# Patient Record
Sex: FEMALE | Race: WHITE | Employment: UNEMPLOYED | URBAN - METROPOLITAN AREA
[De-identification: names, ages, dates, MRNs, and addresses within clinical notes are randomized per-mention and may not be internally consistent; named-entity substitution may affect disease eponyms.]

---

## 2017-09-08 ENCOUNTER — NURSE ONLY (OUTPATIENT)
Dept: CARDIOLOGY CLINIC | Age: 57
End: 2017-09-08

## 2017-09-08 ENCOUNTER — OFFICE VISIT (OUTPATIENT)
Dept: CARDIOLOGY CLINIC | Age: 57
End: 2017-09-08

## 2017-09-08 VITALS
BODY MASS INDEX: 30.96 KG/M2 | HEART RATE: 80 BPM | SYSTOLIC BLOOD PRESSURE: 130 MMHG | HEIGHT: 61 IN | DIASTOLIC BLOOD PRESSURE: 86 MMHG | WEIGHT: 164 LBS

## 2017-09-08 DIAGNOSIS — R07.9 CHEST PAIN, UNSPECIFIED TYPE: ICD-10-CM

## 2017-09-08 DIAGNOSIS — R00.2 PALPITATIONS: Primary | ICD-10-CM

## 2017-09-08 DIAGNOSIS — E78.5 DYSLIPIDEMIA: ICD-10-CM

## 2017-09-08 DIAGNOSIS — R07.9 CHEST PAIN IN ADULT: ICD-10-CM

## 2017-09-08 DIAGNOSIS — I10 ESSENTIAL HYPERTENSION: ICD-10-CM

## 2017-09-08 DIAGNOSIS — Z72.0 TOBACCO USE: ICD-10-CM

## 2017-09-08 DIAGNOSIS — R00.2 PALPITATIONS: ICD-10-CM

## 2017-09-08 PROCEDURE — 99204 OFFICE O/P NEW MOD 45 MIN: CPT | Performed by: INTERNAL MEDICINE

## 2017-09-08 PROCEDURE — 93000 ELECTROCARDIOGRAM COMPLETE: CPT | Performed by: INTERNAL MEDICINE

## 2017-09-08 RX ORDER — ATORVASTATIN CALCIUM 10 MG/1
10 TABLET, FILM COATED ORAL DAILY
COMMUNITY

## 2017-09-08 RX ORDER — OMEPRAZOLE 20 MG/1
20 CAPSULE, DELAYED RELEASE ORAL 2 TIMES DAILY
COMMUNITY

## 2017-09-14 ENCOUNTER — HOSPITAL ENCOUNTER (OUTPATIENT)
Dept: NON INVASIVE DIAGNOSTICS | Age: 57
Discharge: HOME | End: 2017-09-14
Attending: INTERNAL MEDICINE | Admitting: INTERNAL MEDICINE

## 2017-09-14 DIAGNOSIS — R07.9 CHEST PAIN: ICD-10-CM

## 2017-09-14 LAB
LV EF: 85 %
LVEF MODALITY: NORMAL

## 2017-09-29 PROCEDURE — 93272 ECG/REVIEW INTERPRET ONLY: CPT | Performed by: INTERNAL MEDICINE

## 2017-10-05 ENCOUNTER — TELEPHONE (OUTPATIENT)
Dept: CARDIOLOGY CLINIC | Age: 57
End: 2017-10-05

## 2017-11-02 ENCOUNTER — PAT TELEPHONE (OUTPATIENT)
Dept: PREADMISSION TESTING | Age: 57
End: 2017-11-02

## 2017-11-02 VITALS — BODY MASS INDEX: 30.21 KG/M2 | HEIGHT: 61 IN | WEIGHT: 160 LBS

## 2017-11-02 RX ORDER — CLONAZEPAM 1 MG/1
1 TABLET ORAL 3 TIMES DAILY PRN
COMMUNITY

## 2017-11-02 NOTE — PROGRESS NOTES
[facility] time___0700_________        Surgery time_0800___________    Take the following medications with a sip of water:    Do not eat or drink anything after 12:00 midnight prior to your surgery. EXCEPT PREP  This includes water chewing gum, mints and ice chips. You may brush your teeth and gargle the morning of your surgery, but do not swallow the water      You may be asked to stop blood thinners such as Coumadin, Plavix, Fragmin, Lovenox, etc., or any anti-inflammatories such as:  Aspirin, Ibuprofen, Advil, Naproxen prior to your surgery. We also ask that you stop any OTC medications such as fish oil, vitamin E, glucosamine, garlic, Multivitamins, COQ 10, etc.    We ask that you do not smoke 24 hours prior to surgery  We ask that you do not  drink any alcoholic beverages 24 hours prior to surgery     You must make arrangements for a responsible adult to take you home after your surgery. For your safety you will not be allowed to leave alone or drive yourself home. Your surgery will be cancelled if you do not have a ride home. Also for your safety, it is strongly suggested that someone stay with you the first 24 hours after your surgery. A parent or legal guardian must accompany a child scheduled for surgery and plan to stay at the hospital until the child is discharged. Please do not bring other children with you. For your comfort, please wear simple loose fitting clothing to the hospital.  Please do not bring valuables. Do not wear any make-up or nail polish on your fingers or toes      For your safety, please do not wear any jewelry or body piercing's on the day of surgery. All jewelry must be removed. If you have dentures, they will be removed before going to operating room. For your convenience, we will provide you with a container.     If you wear contact lenses or glasses, they will be removed, please bring a case for

## 2017-11-08 ENCOUNTER — SURG/PROC ORDERS (OUTPATIENT)
Dept: ANESTHESIOLOGY | Age: 57
End: 2017-11-08

## 2017-11-08 RX ORDER — SODIUM CHLORIDE 0.9 % (FLUSH) 0.9 %
10 SYRINGE (ML) INJECTION EVERY 12 HOURS SCHEDULED
Status: CANCELLED | OUTPATIENT
Start: 2017-11-08

## 2017-11-08 RX ORDER — SODIUM CHLORIDE 0.9 % (FLUSH) 0.9 %
10 SYRINGE (ML) INJECTION PRN
Status: CANCELLED | OUTPATIENT
Start: 2017-11-08

## 2017-11-08 RX ORDER — SODIUM CHLORIDE 9 MG/ML
INJECTION, SOLUTION INTRAVENOUS CONTINUOUS
Status: CANCELLED | OUTPATIENT
Start: 2017-11-08

## 2017-11-09 ENCOUNTER — HOSPITAL ENCOUNTER (OUTPATIENT)
Dept: ENDOSCOPY | Age: 57
Discharge: HOME | End: 2017-11-09
Attending: INTERNAL MEDICINE | Admitting: INTERNAL MEDICINE

## 2017-11-09 VITALS
RESPIRATION RATE: 12 BRPM | HEART RATE: 70 BPM | WEIGHT: 156 LBS | SYSTOLIC BLOOD PRESSURE: 128 MMHG | TEMPERATURE: 98.2 F | BODY MASS INDEX: 29.45 KG/M2 | DIASTOLIC BLOOD PRESSURE: 71 MMHG | HEIGHT: 61 IN | OXYGEN SATURATION: 98 %

## 2017-11-09 RX ORDER — SODIUM CHLORIDE 0.9 % (FLUSH) 0.9 %
10 SYRINGE (ML) INJECTION EVERY 12 HOURS SCHEDULED
Status: DISCONTINUED | OUTPATIENT
Start: 2017-11-09 | End: 2017-11-10 | Stop reason: HOSPADM

## 2017-11-09 RX ORDER — SODIUM CHLORIDE 9 MG/ML
INJECTION, SOLUTION INTRAVENOUS CONTINUOUS
Status: DISCONTINUED | OUTPATIENT
Start: 2017-11-09 | End: 2017-11-10 | Stop reason: HOSPADM

## 2017-11-09 RX ORDER — SODIUM CHLORIDE 0.9 % (FLUSH) 0.9 %
10 SYRINGE (ML) INJECTION PRN
Status: DISCONTINUED | OUTPATIENT
Start: 2017-11-09 | End: 2017-11-10 | Stop reason: HOSPADM

## 2017-11-09 RX ADMIN — SODIUM CHLORIDE: 9 INJECTION, SOLUTION INTRAVENOUS at 07:43

## 2017-11-09 ASSESSMENT — PAIN - FUNCTIONAL ASSESSMENT: PAIN_FUNCTIONAL_ASSESSMENT: 0-10

## 2017-11-09 ASSESSMENT — PAIN SCALES - GENERAL
PAINLEVEL_OUTOF10: 0
PAINLEVEL_OUTOF10: 0

## 2017-11-09 NOTE — H&P
[facility]   Pre-operative History and Physical    Patient: Freda Dobbins  : 1960  Acct#:         HISTORY OF PRESENT ILLNESS:    The patient is a 62 y.o. female  who presents with colon cancer screening  Past Medical History:        Diagnosis Date    Anxiety     Asthma     Allergy induced Asthma    Depression     HYPERCHOLESTERAEMIA     Hypertension      Past Surgical History:        Procedure Laterality Date     SECTION      CYST REMOVAL  [id number]    H/O pilonidal cyst (7 surgeries for that)    PILONIDAL CYST EXCISION      x 7     Medications Prior to Admission:   Current Outpatient Prescriptions on File Prior to Encounter   Medication Sig Dispense Refill    clonazePAM (KLONOPIN) 1 MG tablet Take 1 mg by mouth 3 times daily as needed      atorvastatin (LIPITOR) 10 MG tablet Take 10 mg by mouth daily      Venlafaxine HCl (EFFEXOR PO) Take 225 mg by mouth daily      omeprazole (PRILOSEC) 20 MG delayed release capsule Take 20 mg by mouth daily      traZODone (DESYREL) 100 MG tablet Take 300 mg by mouth nightly.  ALBUTEROL SULFATE HFA IN Inhale 2 puffs into the lungs every 4 hours as needed (FOR sob).  hydrochlorothiazide (HYDRODIURIL) 25 MG tablet Take 1 tablet by mouth daily. 30 tablet 2    aspirin 81 MG EC tablet Take 81 mg by mouth See Admin Instructions. Indications: every M,W, and F      albuterol (PROVENTIL HFA) 108 (90 BASE) MCG/ACT inhaler Inhale 2 puffs into the lungs every 6 hours as needed for Wheezing. 1 Inhaler 2     No current facility-administered medications on file prior to encounter. Allergies:  Latex; Avocado; Banana; Ace inhibitors; and Kiwi extract    Social History:      Social History     Social History    Marital status:      Spouse name: N/A    Number of children: N/A    Years of education: N/A     Occupational History    Not on file.      Social History Main Topics    Smoking status: Current Every Day Smoker     Packs/day: 0.50

## 2017-11-09 NOTE — BRIEF OP NOTE
Brief Postoperative Note  Gina Gan  1960  [id number]    Previous Colonoscopy: No  Date: Unknown  Greater than 3 years?  Yes    Pre-operative Diagnosis: Screening    Post-operative Diagnosis: Same    Procedure: Colonoscopy    Anesthesia: MAC    Surgeons/Assistants: Joe    Estimated Blood Loss: None    Complications: None    Specimens: Was Not Obtained    Findings: See dictated report    Electronically signed by Prachi Chang MD, on 11/9/2017, at 8:19 AM

## 2017-11-09 NOTE — ANESTHESIA POST-OP
Anesthesia Post-op Note    Patient: Smith Hand    Procedure(s) Performed: colonoscopy    DOS: 11.9.17    Surgeon: Alberto Fuller     Anesthesia type: TIVA    Post-op assessment:  Anesthetic Problems: no   Last Vitals:    Vitals:    11/09/17 0840   BP: 128/71   Pulse: 70   Resp: 12   Temp:    SpO2: 98%     Cardiovascular System Stable: yes  Respiratory Function: Airway Patent yes  ETT no  Ventilator no  Level of consciousness: awake, alert and oriented  Post-op pain: adequate analgesia  Hydration Adequate: yes  Nausea/Vomiting:no  Other Issues:

## 2020-08-19 RX ORDER — MELATONIN 10 MG
CAPSULE ORAL
COMMUNITY

## 2020-08-19 RX ORDER — AMOXICILLIN 250 MG
1 CAPSULE ORAL DAILY
COMMUNITY

## 2020-08-19 RX ORDER — IBUPROFEN 400 MG/1
400 TABLET ORAL EVERY 6 HOURS PRN
COMMUNITY

## 2020-08-19 RX ORDER — M-VIT,TX,IRON,MINS/CALC/FOLIC 27MG-0.4MG
1 TABLET ORAL DAILY
COMMUNITY

## 2020-08-20 ENCOUNTER — OFFICE VISIT (OUTPATIENT)
Dept: PRIMARY CARE CLINIC | Age: 60
End: 2020-08-20

## 2020-08-20 NOTE — PATIENT INSTRUCTIONS
Advance Care Planning  People with COVID-19 may have no symptoms, mild symptoms, such as fever, cough, and shortness of breath or they may have more severe illness, developing severe and fatal pneumonia. As a result, Advance Care Planning with attention to naming a health care decision maker (someone you trust to make healthcare decisions for you if you could not speak for yourself) and sharing other health care preferences is important BEFORE a possible health crisis. Please contact your Primary Care Provider to discuss Advance Care Planning. Preventing the Spread of Coronavirus Disease 2019 in Homes and Residential Communities  For the most recent information go to [employer]    Prevention steps for People with confirmed or suspected COVID-19 (including persons under investigation) who do not need to be hospitalized  and   People with confirmed COVID-19 who were hospitalized and determined to be medically stable to go home    Your healthcare provider and public health staff will evaluate whether you can be cared for at home. If it is determined that you do not need to be hospitalized and can be isolated at home, you will be monitored by staff from your local or state health department. You should follow the prevention steps below until a healthcare provider or local or state health department says you can return to your normal activities. Stay home except to get medical care  People who are mildly ill with COVID-19 are able to isolate at home during their illness. You should restrict activities outside your home, except for getting medical care. Do not go to work, school, or public areas. Avoid using public transportation, ride-sharing, or taxis. Separate yourself from other people and animals in your home  People: As much as possible, you should stay in a specific room and away from other people in your home.  Also, you should use a separate before eating or preparing food. If soap and water are not readily available, use an alcohol-based hand  with at least 60% alcohol, covering all surfaces of your hands and rubbing them together until they feel dry. Soap and water are the best option if hands are visibly dirty. Avoid touching your eyes, nose, and mouth with unwashed hands. Avoid sharing personal household items  You should not share dishes, drinking glasses, cups, eating utensils, towels, or bedding with other people or pets in your home. After using these items, they should be washed thoroughly with soap and water. Clean all high-touch surfaces everyday  High touch surfaces include counters, tabletops, doorknobs, bathroom fixtures, toilets, phones, keyboards, tablets, and bedside tables. Also, clean any surfaces that may have blood, stool, or body fluids on them. Use a household cleaning spray or wipe, according to the label instructions. Labels contain instructions for safe and effective use of the cleaning product including precautions you should take when applying the product, such as wearing gloves and making sure you have good ventilation during use of the product. Monitor your symptoms  Seek prompt medical attention if your illness is worsening (e.g., difficulty breathing). Before seeking care, call your healthcare provider and tell them that you have, or are being evaluated for, COVID-19. Put on a facemask before you enter the facility. These steps will help the healthcare providers office to keep other people in the office or waiting room from getting infected or exposed. Ask your healthcare provider to call the local or state health department. Persons who are placed under active monitoring or facilitated self-monitoring should follow instructions provided by their local health department or occupational health professionals, as appropriate. When working with your local health department check their available hours.   If you have a medical emergency and need to call 911, notify the dispatch personnel that you have, or are being evaluated for COVID-19. If possible, put on a facemask before emergency medical services arrive. Discontinuing home isolation  Patients with confirmed COVID-19 should remain under home isolation precautions until the risk of secondary transmission to others is thought to be low. The decision to discontinue home isolation precautions should be made on a case-by-case basis, in consultation with healthcare providers and state and local health departments.

## 2020-08-20 NOTE — PROGRESS NOTES
Jolie Us received a viral test for COVID-19. They were educated on isolation and quarantine as appropriate. For any symptoms, they were directed to seek care from their PCP, given contact information to establish with a doctor, directed to an urgent care or the emergency room.

## 2020-08-22 LAB — SARS-COV-2, NAA: NOT DETECTED

## 2020-08-25 ENCOUNTER — ANESTHESIA EVENT (OUTPATIENT)
Dept: ENDOSCOPY | Age: 60
End: 2020-08-25
Payer: COMMERCIAL

## 2020-08-26 ENCOUNTER — HOSPITAL ENCOUNTER (OUTPATIENT)
Age: 60
Setting detail: OUTPATIENT SURGERY
Discharge: HOME | End: 2020-08-26
Attending: INTERNAL MEDICINE | Admitting: INTERNAL MEDICINE
Payer: COMMERCIAL

## 2020-08-26 ENCOUNTER — ANESTHESIA (OUTPATIENT)
Dept: ENDOSCOPY | Age: 60
End: 2020-08-26
Payer: COMMERCIAL

## 2020-08-26 VITALS
BODY MASS INDEX: 31.53 KG/M2 | SYSTOLIC BLOOD PRESSURE: 115 MMHG | WEIGHT: 167 LBS | HEART RATE: 66 BPM | HEIGHT: 61 IN | RESPIRATION RATE: 16 BRPM | DIASTOLIC BLOOD PRESSURE: 96 MMHG | TEMPERATURE: 97 F | OXYGEN SATURATION: 100 %

## 2020-08-26 VITALS
OXYGEN SATURATION: 98 % | SYSTOLIC BLOOD PRESSURE: 97 MMHG | RESPIRATION RATE: 14 BRPM | DIASTOLIC BLOOD PRESSURE: 48 MMHG

## 2020-08-26 PROCEDURE — 2500000003 HC RX 250 WO HCPCS

## 2020-08-26 PROCEDURE — 2709999900 HC NON-CHARGEABLE SUPPLY: Performed by: INTERNAL MEDICINE

## 2020-08-26 PROCEDURE — 7100000011 HC PHASE II RECOVERY - ADDTL 15 MIN: Performed by: INTERNAL MEDICINE

## 2020-08-26 PROCEDURE — 3700000001 HC ADD 15 MINUTES (ANESTHESIA): Performed by: INTERNAL MEDICINE

## 2020-08-26 PROCEDURE — 88305 TISSUE EXAM BY PATHOLOGIST: CPT

## 2020-08-26 PROCEDURE — 3609012400 HC EGD TRANSORAL BIOPSY SINGLE/MULTIPLE: Performed by: INTERNAL MEDICINE

## 2020-08-26 PROCEDURE — 2580000003 HC RX 258: Performed by: ANESTHESIOLOGY

## 2020-08-26 PROCEDURE — 3609017100 HC EGD: Performed by: INTERNAL MEDICINE

## 2020-08-26 PROCEDURE — 7100000010 HC PHASE II RECOVERY - FIRST 15 MIN: Performed by: INTERNAL MEDICINE

## 2020-08-26 PROCEDURE — 6360000002 HC RX W HCPCS

## 2020-08-26 PROCEDURE — 3700000000 HC ANESTHESIA ATTENDED CARE: Performed by: INTERNAL MEDICINE

## 2020-08-26 RX ORDER — SODIUM CHLORIDE 0.9 % (FLUSH) 0.9 %
10 SYRINGE (ML) INJECTION EVERY 12 HOURS SCHEDULED
Status: DISCONTINUED | OUTPATIENT
Start: 2020-08-26 | End: 2020-08-26 | Stop reason: HOSPADM

## 2020-08-26 RX ORDER — GLYCOPYRROLATE 0.2 MG/ML
INJECTION INTRAMUSCULAR; INTRAVENOUS PRN
Status: DISCONTINUED | OUTPATIENT
Start: 2020-08-26 | End: 2020-08-26 | Stop reason: SDUPTHER

## 2020-08-26 RX ORDER — SODIUM CHLORIDE 0.9 % (FLUSH) 0.9 %
10 SYRINGE (ML) INJECTION PRN
Status: DISCONTINUED | OUTPATIENT
Start: 2020-08-26 | End: 2020-08-26 | Stop reason: HOSPADM

## 2020-08-26 RX ORDER — LIDOCAINE HYDROCHLORIDE 20 MG/ML
INJECTION, SOLUTION EPIDURAL; INFILTRATION; INTRACAUDAL; PERINEURAL PRN
Status: DISCONTINUED | OUTPATIENT
Start: 2020-08-26 | End: 2020-08-26 | Stop reason: SDUPTHER

## 2020-08-26 RX ORDER — PROPOFOL 10 MG/ML
INJECTION, EMULSION INTRAVENOUS PRN
Status: DISCONTINUED | OUTPATIENT
Start: 2020-08-26 | End: 2020-08-26 | Stop reason: SDUPTHER

## 2020-08-26 RX ORDER — SODIUM CHLORIDE 9 MG/ML
INJECTION, SOLUTION INTRAVENOUS CONTINUOUS
Status: DISCONTINUED | OUTPATIENT
Start: 2020-08-26 | End: 2020-08-26 | Stop reason: HOSPADM

## 2020-08-26 RX ADMIN — SODIUM CHLORIDE: 9 INJECTION, SOLUTION INTRAVENOUS at 09:29

## 2020-08-26 RX ADMIN — LIDOCAINE HYDROCHLORIDE 80 MG: 20 INJECTION, SOLUTION EPIDURAL; INFILTRATION; INTRACAUDAL; PERINEURAL at 10:11

## 2020-08-26 RX ADMIN — GLYCOPYRROLATE 0.1 MG: 0.2 INJECTION, SOLUTION INTRAMUSCULAR; INTRAVENOUS at 10:08

## 2020-08-26 RX ADMIN — PROPOFOL 100 MG: 10 INJECTION, EMULSION INTRAVENOUS at 10:11

## 2020-08-26 RX ADMIN — PROPOFOL 30 MG: 10 INJECTION, EMULSION INTRAVENOUS at 10:17

## 2020-08-26 RX ADMIN — PROPOFOL 50 MG: 10 INJECTION, EMULSION INTRAVENOUS at 10:15

## 2020-08-26 ASSESSMENT — PAIN SCALES - GENERAL
PAINLEVEL_OUTOF10: 0

## 2020-08-26 ASSESSMENT — LIFESTYLE VARIABLES: SMOKING_STATUS: 1

## 2020-08-26 ASSESSMENT — ENCOUNTER SYMPTOMS: SHORTNESS OF BREATH: 0

## 2020-08-26 ASSESSMENT — PAIN - FUNCTIONAL ASSESSMENT: PAIN_FUNCTIONAL_ASSESSMENT: 0-10

## 2020-08-26 NOTE — PROCEDURES
[address] [place] [address]                                 PROCEDURE NOTE    PATIENT NAME: Arcelia Hidalgo                   :        1960  MED REC NO:   [id number]                          ROOM:  ACCOUNT NO:   [de-identified]                           ADMIT DATE: 2020  PROVIDER:     [facility] MARINO Marrufo MD    EGD    DATE OF PROCEDURE:  2020    REFERRING PROVIDER:  Dr. Romeo Waterman:  A 59-year-old female, outpatient. INSTRUMENT USED:  Olympus GIF-Q180. MEDICATIONS OF PROCEDURE:  The patient was premedicated with Diprivan  intravenously as administered by the anesthesiology service. INDICATIONS:  The patient was last seen in the office in 10/2019. She  presents now for her upper endoscopy. She has chronic complaints of  abdominal distention as well as nausea and vomiting, which can occur  while the patient is straining on the commode. That symptom sounds most  compatible with a vasovagal episode. She has many other functional  complaints. PROCEDURE:  The endoscope was inserted into the esophagus without  difficulty. There were several small ulcerations in the distal  esophagus. The Z-line was located at 33 cm, above a 3-cm hiatal hernia. The stomach, duodenal bulb, and descending duodenum were normal.  Random  small bowel biopsies were obtained to evaluate for celiac disease. ESTIMATED BLOOD LOSS:  None. IMPRESSION:  1. Distal reflux esophagitis. 2.  A 3-cm hiatal hernia. 3.  Random small bowel biopsies obtained. PLAN:  The patient takes aspirin and ibuprofen. These would be risk  factors for the esophagitis. She presently takes 20 mg of omeprazole  daily. I will increase this to 40 mg daily. She will call the office for biopsy results and then will follow up  in the office. YORDY Garcia MD    D: 2020 56:49:12       T: 08/26/2020 13:58:32     [id number]  Job#: [id number]     Doc#: [id number]    CC:  Aníbal Jackman MD

## 2020-08-26 NOTE — H&P
[facility]   Pre-operative History and Physical    Patient: José Mera  : 1960  Acct#:         HISTORY OF PRESENT ILLNESS:    The patient is a 61 y.o. female  who presents with abdominal distention, nausea, vomiting  Past Medical History:        Diagnosis Date    Anxiety     Asthma     Allergy induced Asthma    Depression     HYPERCHOLESTERAEMIA     Hypertension      Past Surgical History:        Procedure Laterality Date     SECTION      COLONOSCOPY  2017    colonoscopy by Dr. Cale Young no problems    CYST REMOVAL      H/O pilonidal cyst (7 surgeries for that)    PILONIDAL CYST EXCISION      x 7     Medications prior to admission:   Prior to Admission medications    Medication Sig Start Date End Date Taking? Authorizing Provider   senna-docusate (PERICOLACE) 8.6-50 MG per tablet Take 1 tablet by mouth daily   Yes Historical Provider, MD   Multiple Vitamins-Minerals (THERAPEUTIC MULTIVITAMIN-MINERALS) tablet Take 1 tablet by mouth daily   Yes Historical Provider, MD   Lactobacillus (PROBIOTIC ACIDOPHILUS PO) Take by mouth   Yes Historical Provider, MD   Polyethylene Glycol 3350 (MIRALAX PO) Take by mouth   Yes Historical Provider, MD   Melatonin 10 MG CAPS Take by mouth   Yes Historical Provider, MD   ibuprofen (ADVIL;MOTRIN) 400 MG tablet Take 400 mg by mouth every 6 hours as needed for Pain   Yes Historical Provider, MD   clonazePAM (KLONOPIN) 1 MG tablet Take 1 mg by mouth 3 times daily as needed   Yes Historical Provider, MD   atorvastatin (LIPITOR) 10 MG tablet Take 10 mg by mouth daily   Yes Historical Provider, MD   Venlafaxine HCl (EFFEXOR PO) Take 225 mg by mouth daily   Yes Historical Provider, MD   omeprazole (PRILOSEC) 20 MG delayed release capsule Take 20 mg by mouth daily   Yes Historical Provider, MD   traZODone (DESYREL) 100 MG tablet Take 300 mg by mouth nightly.    Yes Historical Provider, MD   hydrochlorothiazide (HYDRODIURIL) 25 MG tablet Take 1 Relation Age of Onset    Arthritis Mother     Crohn's Disease Mother     High Blood Pressure Mother     High Cholesterol Mother     Heart Attack Paternal Grandfather     Heart Attack Maternal Grandfather     Heart Disease Maternal Grandfather     Cancer Maternal Grandmother         Colon CA in 76s    Arthritis Maternal Grandmother          PHYSICAL EXAM:      /70   Pulse 80   Temp 97.7 °F (36.5 °C) (Temporal)   Resp 14   Ht 5' 1\" (1.549 m)   Wt 167 lb (75.8 kg)   LMP 07/29/2010   SpO2 98%   BMI 31.55 kg/m²  I        Heart: Normal    Lungs: Normal    Abdomen: Normal      ASA Grade: ASA 3 - Patient with moderate systemic disease with functional limitations    II (soft palate, uvula, fauces visible)  ASSESSMENT AND PLAN:    1. Patient is a 61 y.o. female here for EGD  2. Procedure options, risks and benefits reviewed with patient who expresses understanding.

## 2020-08-26 NOTE — ANESTHESIA PRE PROCEDURE
Department of Anesthesiology  Preprocedure Note       Name:  Quita Titus   Age:  61 y.o.  :  1960                                          MRN:  [id number]         Date:  2020      Surgeon: Dalia Jimenez):  Jarrod Seay MD    Procedure: Procedure(s):  EGD ESOPHAGOGASTRODUODENOSCOPY with SMALL BOWEL BIOPSIES    Medications prior to admission:   Prior to Admission medications    Medication Sig Start Date End Date Taking? Authorizing Provider   senna-docusate (PERICOLACE) 8.6-50 MG per tablet Take 1 tablet by mouth daily   Yes Historical Provider, MD   Multiple Vitamins-Minerals (THERAPEUTIC MULTIVITAMIN-MINERALS) tablet Take 1 tablet by mouth daily   Yes Historical Provider, MD   Lactobacillus (PROBIOTIC ACIDOPHILUS PO) Take by mouth   Yes Historical Provider, MD   Polyethylene Glycol 3350 (MIRALAX PO) Take by mouth   Yes Historical Provider, MD   Melatonin 10 MG CAPS Take by mouth   Yes Historical Provider, MD   ibuprofen (ADVIL;MOTRIN) 400 MG tablet Take 400 mg by mouth every 6 hours as needed for Pain   Yes Historical Provider, MD   clonazePAM (KLONOPIN) 1 MG tablet Take 1 mg by mouth 3 times daily as needed   Yes Historical Provider, MD   atorvastatin (LIPITOR) 10 MG tablet Take 10 mg by mouth daily   Yes Historical Provider, MD   Venlafaxine HCl (EFFEXOR PO) Take 225 mg by mouth daily   Yes Historical Provider, MD   omeprazole (PRILOSEC) 20 MG delayed release capsule Take 20 mg by mouth daily   Yes Historical Provider, MD   traZODone (DESYREL) 100 MG tablet Take 300 mg by mouth nightly. Yes Historical Provider, MD   hydrochlorothiazide (HYDRODIURIL) 25 MG tablet Take 1 tablet by mouth daily. 12/1/10 8/19/20 Yes Hal Sprague MD   albuterol (PROVENTIL HFA) 108 (90 BASE) MCG/ACT inhaler Inhale 2 puffs into the lungs every 6 hours as needed for Wheezing. 12/1/10 8/19/20 Yes Hal Sprague MD   aspirin 81 MG EC tablet Take 81 mg by mouth See Admin Instructions.  Indications: every M,W, and F   Yes Historical Provider, MD       Current medications:    Current Facility-Administered Medications   Medication Dose Route Frequency Provider Last Rate Last Dose    0.9 % sodium chloride infusion   Intravenous Continuous Lalita Gold MD        sodium chloride flush 0.9 % injection 10 mL  10 mL Intravenous 2 times per day Lalita Gold MD        sodium chloride flush 0.9 % injection 10 mL  10 mL Intravenous PRN Lalita Gold MD           Allergies: Allergies   Allergen Reactions    Latex     Avocado Hives    Banana Hives    Ace Inhibitors     Kiwi Extract        Problem List:    Patient Active Problem List   Diagnosis Code    Hypertension I10    Depression F32.9    Anxiety F41.9    HYPERCHOLESTERAEMIA     Cervical spondylosis with myelopathy M47.12    Palpitations R00.2    Chest pain R07.9    Dyslipidemia E78.5    Tobacco use Z72.0       Past Medical History:        Diagnosis Date    Anxiety     Asthma     Allergy induced Asthma    Depression     HYPERCHOLESTERAEMIA     Hypertension        Past Surgical History:        Procedure Laterality Date     SECTION      COLONOSCOPY  2017    colonoscopy by Dr. Nancy Hernandez no problems    CYST REMOVAL  4021-9482    H/O pilonidal cyst (7 surgeries for that)    PILONIDAL CYST EXCISION      x 7       Social History:    Social History     Tobacco Use    Smoking status: Current Every Day Smoker     Packs/day: 0.50     Types: Cigarettes    Smokeless tobacco: Never Used    Tobacco comment: smoked of 20 years. 6-10 cigarettes/day   Substance Use Topics    Alcohol use: No                                Ready to quit: Not Answered  Counseling given: Not Answered  Comment: smoked of 20 years.  6-10 cigarettes/day      Vital Signs (Current):   Vitals:    20 1031 20 0920   BP:  136/70   Pulse:  80   Resp:  14   Temp:  97.7 °F (36.5 °C)   TempSrc:  Temporal   SpO2:  98%   Weight: 168 lb (76.2 kg) 167 lb (75.8 kg)   Height: 5' 1\" (1.549 m)

## 2020-08-26 NOTE — OP NOTE
Operative Note      Patient: Dora Juan  YOB: 1960  MRN: [id number]    Date of Procedure: 8/26/2020    Pre-Op Diagnosis: Distended abdomen; Nausea and vomiting;    Post-Op Diagnosis: Same       Procedure(s):  EGD ESOPHAGOGASTRODUODENOSCOPY with SMALL BOWEL BIOPSIES    Surgeon(s):  Aj Abbott MD    Assistant:   * No surgical staff found *    Anesthesia: Monitor Anesthesia Care    Estimated Blood Loss (mL): None    Complications: None    Specimens:   ID Type Source Tests Collected by Time Destination   A : a. random small bowel bx Tissue Tissue SURGICAL PATHOLOGY Aj Abbott MD 8/26/2020 1015        Implants:  * No implants in log *      Drains: * No LDAs found *    Findings: See dictated report    Detailed Description of Procedure:   See dictated report    Electronically signed by Aj Abbott MD on 8/26/2020 at 10:22 AM

## 2020-08-26 NOTE — BRIEF OP NOTE
Brief Postoperative Note    Shaan Meraz  YOB: 1960  [id number]    Pre-operative Diagnosis: Abdominal distention, nausea, vomiting    Post-operative Diagnosis: Same    Procedure: EGD    Anesthesia: MAC    Surgeons/Assistants: Arely Bustos MD    Estimated Blood Loss: None    Complications: None    Specimens: Was Obtained: Small bowel    Findings: See dictated report    Electronically signed by Arely Bustos MD on 8/26/2020 at 10:21 AM

## 2020-08-26 NOTE — PROGRESS NOTES
Patient and responsible adult verbalized understanding of discharge instructions, sedation medication, and potential complications including pain. Patient instructed to call Doctor if complications occur.
Preoperative Screening for Elective Surgery/Invasive Procedures While COVID-19 present in the community     Have you had any of the following symptoms? o Fever, chills  o Cough  o Shortness of breath  o Muscle aches/pain  o Diarrhea  o Abdominal pain, nausea, vomiting  o Loss or decrease in taste and / or smell   Risk of Exposure  o Have you recently been hospitalized for COVID-19 or flu-like illness, if so when?  o Recently diagnosed with COVID-19, if so when?  o Recently tested for COVID-19, if so when?  o Have you been in close contact with a person or family member who currently has or recently had COVID-19? If yes, when and in what context?  o Do you live with anybody who in the last 14 days has had fever, chills, shortness of breath, muscle aches, flu-like illness?  o Do you have any close contacts or family members who are currently in the hospital for COVID-19 or flu-like illness? If yes, assess recent close contact with this person. Indicate if the patient has a positive screen by answering yes to one or more of the above questions. Patients who test positive or screen positive prior to surgery or on the day of surgery should be evaluated in conjunction with the surgeon/proceduralist/anesthesiologist to determine the urgency of the procedure.
you have a living will and a durable power of  for healthcare, please bring in a copy. As part of our patient safety program to minimize surgical site infections, we ask you to do the following:    · Please notify your surgeon if you develop any illness between         now and the  day of your surgery. · This includes a cough, cold, fever, sore throat, nausea,         or vomiting, and diarrhea, etc.  ·  Please notify your surgeon if you experience dizziness, shortness         of breath or blurred vision between now and the time of your surgery. You may shower the night before surgery or the morning of   your surgery with an antibacterial soap. You will need to bring a photo ID and insurance card    [facility] has an onsite pharmacy, would you like to utilize our pharmacy     If you will be staying overnight and use a C-pap machine, please bring   your C-pap to hospital     Our goal is to provide you with excellent care, therefore, visitors will be limited to two(2) in the room at a time so that we may focus on providing this care for you. Please contact pre-admission testing if you have any further questions. [facility] phone number:  [phone]  Please note these are generalized instructions for all surgical cases, you may be provided with more specific instructions according to your surgery.

## 2021-11-12 ENCOUNTER — TELEPHONE (OUTPATIENT)
Dept: CARDIOLOGY CLINIC | Age: 61
End: 2021-11-12

## 2021-11-12 DIAGNOSIS — R00.2 PALPITATIONS: Primary | ICD-10-CM

## 2021-11-12 DIAGNOSIS — R53.83 FATIGUE, UNSPECIFIED TYPE: ICD-10-CM

## 2021-11-12 NOTE — TELEPHONE ENCOUNTER
Having palpitations every day for the last month . She is having fatigue along with the palpitations and pcp told her to make an appt with her cardiologist.  She saw Tonya Lagos in 2017 but doesn't want to go to [facility]. Patient asking to be seen by EP in [place] . She has seen no other cardiologist since seeing Tonya Lagos in 2017.

## 2021-11-12 NOTE — TELEPHONE ENCOUNTER
Please place a 30 day monitor on the patient and schedule her first of the year with TOSIN, if we see anything urgent we will move her appointment up

## 2021-11-16 ENCOUNTER — NURSE ONLY (OUTPATIENT)
Dept: CARDIOLOGY CLINIC | Age: 61
End: 2021-11-16
Payer: COMMERCIAL

## 2021-11-16 DIAGNOSIS — R00.2 PALPITATIONS: ICD-10-CM

## 2021-11-16 PROCEDURE — 93228 REMOTE 30 DAY ECG REV/REPORT: CPT | Performed by: INTERNAL MEDICINE

## 2022-01-05 NOTE — PROGRESS NOTES
[facility]   Electrophysiology Consultation   Date: 1/5/2022  Reason for Consultation: palpitiations  Consult Requesting Physician: ***   No chief complaint on file. CC: palpitations   HPI: Nanette Harvey is a 64 y.o. female with a past medical history of HLD, HTN . Former patient of Dr. Majo Dyer , last seen seen  09/08/2017 for chest pain and palpitations      Monitor 09/2017 showed brief episodes of SVT. She recently saw  Her PCP with c/o increasing episodes of skipped beats and palpitations. MCOT 11/16/2021 to 12/15/2021 6 episdoes of SVT, with the fastest being 166 bpm,  1% PVC, <1% PVC, Symptoms reported as \" skipped beat\" with PVC. Assessment and plan:   Palpitations   - monitor 11/16/2021  showed 6 episodes of SVT , symptoms with PVC     HTN  -Controlled/Not well controlled***  -BP goal <130/80  -Home BP monitoring encouraged, printed information provided on how to accurately measure BP at home.  -Counseled to follow a low salt diet to assure blood pressure remains controlled for cardiovascular risk factor modification.   -The patient is counseled to get regular exercise 3-5 times per week and maintain a healthy weight reduce cardiovascular risk factors. - continue HCTZ     HLD   - Continue Lipitor     - lipids 09/15/2020 - , HDL 47, LDL 91      Tobacco Use    - discussed cessation     Plan:     Patient Active Problem List    Diagnosis Date Noted    Palpitations 09/08/2017    Chest pain 09/08/2017    Dyslipidemia 09/08/2017    Tobacco use 09/08/2017    Cervical spondylosis with myelopathy 10/22/2010    Hypertension     Depression     Anxiety     HYPERCHOLESTERAEMIA      Diagnostic studies:   EKG today     MCOT 11/16/2021 to 12/15/2021 6 episdoes of SVT, with the fastest being 166 bpm,  1% PVC, <1% PVC, Symptoms reported as \" skipped beat\" with PVC.      NM stress test 09/14/2017    Summary       No EKG evidence for ischemia with exercise       Normal LV size and systolic function.       There is normal isotope uptake at stress and rest. There is no evidence of    myocardial ischemia or scar.           Holter 09/2017 - brief episodes of SVT - 8 beats  At 175 bpm . Occasional PVC     Holter monitor 7/30/09 -associated with symptoms. SR with avg HR 88 (). No PVCs, 5 PACs       I independently reviewed the cardiac diagnostic studies, ECG and relevant imaging studies. Lab Results   Component Value Date    LVEF 85 09/14/2017     No results found for: TSHFT4, TSH    Physical Examination:  There were no vitals filed for this visit. Wt Readings from Last 3 Encounters:   08/26/20 167 lb (75.8 kg)   11/09/17 156 lb (70.8 kg)   11/02/17 160 lb (72.6 kg)       · Constitutional: Oriented. No distress. · Head: Normocephalic and atraumatic. · Mouth/Throat: Oropharynx is clear and moist.   · Eyes: Conjunctivae normal. EOM are normal.   · Neck: Neck supple. No rigidity. No JVD present. · Cardiovascular: Normal rate, regular rhythm, S1&S2. · Pulmonary/Chest: Bilateral respiratory sounds. No wheezes, No rhonchi. · Abdominal: Soft. Bowel sounds present. No distension, No tenderness. · Musculoskeletal: No tenderness. No edema    · Lymphadenopathy: Has no cervical adenopathy. · Neurological: Alert and oriented. Cranial nerve appears intact, No Gross deficit   · Skin: Skin is warm and dry. No rash noted. · Psychiatric: Has a normal behavior       Review of System:  [x] Full ROS obtained and negative except as mentioned in HPI    Prior to Admission medications    Medication Sig Start Date End Date Taking?  Authorizing Provider   senna-docusate (PERICOLACE) 8.6-50 MG per tablet Take 1 tablet by mouth daily    Historical Provider, MD   Multiple Vitamins-Minerals (THERAPEUTIC MULTIVITAMIN-MINERALS) tablet Take 1 tablet by mouth daily    Historical Provider, MD   Lactobacillus (PROBIOTIC ACIDOPHILUS PO) Take by mouth    Historical Provider, MD   Polyethylene Glycol 3350 (MIRALAX PO) Take by mouth    Historical Provider, MD   Melatonin 10 MG CAPS Take by mouth    Historical Provider, MD   ibuprofen (ADVIL;MOTRIN) 400 MG tablet Take 400 mg by mouth every 6 hours as needed for Pain    Historical Provider, MD   clonazePAM (KLONOPIN) 1 MG tablet Take 1 mg by mouth 3 times daily as needed    Historical Provider, MD   atorvastatin (LIPITOR) 10 MG tablet Take 10 mg by mouth daily    Historical Provider, MD   Venlafaxine HCl (EFFEXOR PO) Take 225 mg by mouth daily    Historical Provider, MD   omeprazole (PRILOSEC) 20 MG delayed release capsule Take 20 mg by mouth daily    Historical Provider, MD   traZODone (DESYREL) 100 MG tablet Take 300 mg by mouth nightly. Historical Provider, MD   hydrochlorothiazide (HYDRODIURIL) 25 MG tablet Take 1 tablet by mouth daily. 12/1/10 8/19/20  Gustavo Ku MD   albuterol (PROVENTIL HFA) 108 (90 BASE) MCG/ACT inhaler Inhale 2 puffs into the lungs every 6 hours as needed for Wheezing. 12/1/10 8/19/20  Gustavo Ku MD   aspirin 81 MG EC tablet Take 81 mg by mouth See Admin Instructions. Indications: every M,W, and F    Historical Provider, MD       Past Medical History:   Diagnosis Date    Anxiety     Asthma     Allergy induced Asthma    Depression     HYPERCHOLESTERAEMIA     Hypertension         Past Surgical History:   Procedure Laterality Date     SECTION      COLONOSCOPY  2017    colonoscopy by Dr. Tobias Goldmann no problems    CYST REMOVAL  [id number]    H/O pilonidal cyst (7 surgeries for that)    PILONIDAL CYST EXCISION      x 7    UPPER GASTROINTESTINAL ENDOSCOPY N/A 2020    EGD BIOPSY performed by Shayna Ivan MD at [address]   Allergen Reactions    Latex     Avocado Hives    Banana Hives    Ace Inhibitors     Kiwi Extract        Social History:  Reviewed. reports that she has been smoking cigarettes. She has been smoking about 0.50 packs per day.  She has never used smokeless tobacco. She reports that she does not drink alcohol and does not use drugs. Family History:  Reviewed. Reviewed. No family history of SCD. Relevant and available labs, and cardiovascular diagnostics reviewed. Reviewed. I independently reviewed all cardiac tracing. I independently reviewed relevant and available cardiac diagnostic tests ECG, CXR, Echo, Stress test, Device interrogation, Holter, CT scan.   *** Outside medical records via Care everywhere reviewed and summarized in H&P above. *** Complex medical condition with multiple medical problems affecting prognosis and outcome of EP interventions    ***   - The patient is counseled to follow a low salt diet to assure blood pressure remains controlled for cardiovascular risk factor modification.   - The patient is counseled to avoid excess caffeine, and energy drinks as this may exacerbated ectopy and arrhythmia. - The patient is counseled to get regular exercise 3-5 times per week to control cardiovascular risk factors. - The patient is counseled to lose weigt to control cardiovascular risk factors. - The patient is counseled to avoid tobacco use. All questions and concerns were addressed to the patient/family. Alternatives to my treatment were discussed. I have discussed the above stated plan and the patient verbalized understanding and agreed with the plan. ***   NOTE: This report was transcribed using voice recognition software. Every effort was made to ensure accuracy, however, inadvertent computerized transcription errors may be present.      Duy Gallagher MD, [facility], [address] [place] [address]   Office: [phone]  Fax: [phone]

## 2022-01-06 ENCOUNTER — OFFICE VISIT (OUTPATIENT)
Dept: CARDIOLOGY CLINIC | Age: 62
End: 2022-01-06
Payer: COMMERCIAL

## 2022-01-06 VITALS
DIASTOLIC BLOOD PRESSURE: 82 MMHG | BODY MASS INDEX: 33.12 KG/M2 | HEART RATE: 95 BPM | OXYGEN SATURATION: 98 % | HEIGHT: 61 IN | SYSTOLIC BLOOD PRESSURE: 130 MMHG | WEIGHT: 175.4 LBS

## 2022-01-06 DIAGNOSIS — R00.2 PALPITATIONS: ICD-10-CM

## 2022-01-06 DIAGNOSIS — I10 PRIMARY HYPERTENSION: ICD-10-CM

## 2022-01-06 DIAGNOSIS — I47.1 SVT (SUPRAVENTRICULAR TACHYCARDIA) (HCC): Primary | ICD-10-CM

## 2022-01-06 DIAGNOSIS — I49.3 PVC (PREMATURE VENTRICULAR CONTRACTION): ICD-10-CM

## 2022-01-06 DIAGNOSIS — Z72.0 TOBACCO USE: ICD-10-CM

## 2022-01-06 PROBLEM — I47.10 SVT (SUPRAVENTRICULAR TACHYCARDIA): Status: ACTIVE | Noted: 2022-01-06

## 2022-01-06 PROCEDURE — 93000 ELECTROCARDIOGRAM COMPLETE: CPT | Performed by: INTERNAL MEDICINE

## 2022-01-06 PROCEDURE — G8427 DOCREV CUR MEDS BY ELIG CLIN: HCPCS | Performed by: INTERNAL MEDICINE

## 2022-01-06 PROCEDURE — G8484 FLU IMMUNIZE NO ADMIN: HCPCS | Performed by: INTERNAL MEDICINE

## 2022-01-06 PROCEDURE — 4004F PT TOBACCO SCREEN RCVD TLK: CPT | Performed by: INTERNAL MEDICINE

## 2022-01-06 PROCEDURE — 99214 OFFICE O/P EST MOD 30 MIN: CPT | Performed by: INTERNAL MEDICINE

## 2022-01-06 PROCEDURE — G8417 CALC BMI ABV UP PARAM F/U: HCPCS | Performed by: INTERNAL MEDICINE

## 2022-01-06 PROCEDURE — 3017F COLORECTAL CA SCREEN DOC REV: CPT | Performed by: INTERNAL MEDICINE

## 2022-01-06 RX ORDER — QUETIAPINE FUMARATE 100 MG/1
TABLET, FILM COATED ORAL
COMMUNITY
Start: 2021-10-20

## 2022-01-06 NOTE — PROGRESS NOTES
[facility]   Electrophysiology Consultation   Date: 1/6/2022  Reason for Consultation: palpitiations  Consult Requesting Physician: Dr. Bertha Caputo   Chief Complaint   Patient presents with    New Patient    Palpitations    Hypertension       CC: palpitations   HPI: Gris Wang is a 64 y.o. female with a past medical history of HLD, HTN . Former patient of Dr. Sarabjit Avendaño , last seen seen  09/08/2017 for chest pain and palpitations      Monitor 09/2017 showed brief episodes of SVT. She recently saw  Her PCP with c/o increasing episodes of skipped beats and palpitations. MCOT 11/16/2021 to 12/15/2021 6 episdoes of SVT, with the fastest being 166 bpm,  1% PVC, <1% PVC, Symptoms reported as \" skipped beat\" with PVC. Today, she is here today for her palpitations. She reports that her palpitations stopped the last week of her monitor. She believes the palpitations this time started when she started taking Seroquel. She denies having chest pain or shortness of breath. Assessment and plan:     Palpitations/SVT/rare PVC   - monitor 11/16/2021  showed 8 episodes of SVT , symptoms with PVC and some of the SVTs   - experiencing for many years   -If palpitations reoccur, will start diltiazem or verapmil    She had a few episodes of SVT that are short. I explained to her that her symptoms could be related to SVT or PVCs. I would consider a calcium channel blocker given her history of lung disease if her symptoms becomes worse before doing anything else. Obviously if the frequency of episodes of very high, ablation can be considered in the future. At this point only observation is recommended.       HTN  -Controlled  -BP goal <130/80  -Home BP monitoring encouraged, printed information provided on how to accurately measure BP at home.  -Counseled to follow a low salt diet to assure blood pressure remains controlled for cardiovascular risk factor modification.   -The patient is counseled to get regular exercise 3-5 times per week and maintain a healthy weight reduce cardiovascular risk factors. - continue HCTZ     HLD   - Continue Lipitor     - lipids 09/15/2020 - , HDL 47, LDL 91      Tobacco Use    - discussed cessation     Plan:   Continue all medications  If palpitations reoccur, will start diltiazem or verapmil  Follow up as needed    Patient Active Problem List    Diagnosis Date Noted    Palpitations 09/08/2017    Chest pain 09/08/2017    Dyslipidemia 09/08/2017    Tobacco use 09/08/2017    Cervical spondylosis with myelopathy 10/22/2010    Hypertension     Depression     Anxiety     HYPERCHOLESTERAEMIA      Diagnostic studies:   EKG today   Sinus Rhythm  QTcH 402 QRS 84    MCOT 11/16/2021 to 12/15/2021 6 episdoes of SVT, with the fastest being 166 bpm,  1% PVC, <1% PVC, Symptoms reported as \" skipped beat\" with PVC. NM stress test 09/14/2017    Summary       No EKG evidence for ischemia with exercise       Normal LV size and systolic function.       There is normal isotope uptake at stress and rest. There is no evidence of    myocardial ischemia or scar.           Holter 09/2017 - brief episodes of SVT - 8 beats  At 175 bpm . Occasional PVC     Holter monitor 7/30/09 -associated with symptoms. SR with avg HR 88 (). No PVCs, 5 PACs       I independently reviewed the cardiac diagnostic studies, ECG and relevant imaging studies. Lab Results   Component Value Date    LVEF 85 09/14/2017     No results found for: TSHFT4, TSH    Physical Examination:  Vitals:    01/06/22 1049   BP: 130/82   Pulse: 95   SpO2: 98%      Wt Readings from Last 3 Encounters:   01/06/22 175 lb 6.4 oz (79.6 kg)   08/26/20 167 lb (75.8 kg)   11/09/17 156 lb (70.8 kg)       · Constitutional: Oriented. No distress. · Head: Normocephalic and atraumatic. · Mouth/Throat: Oropharynx is clear and moist.   · Eyes: Conjunctivae normal. EOM are normal.   · Neck: Neck supple. No rigidity.  No JVD present. · Cardiovascular: Normal rate, regular rhythm, S1&S2. · Pulmonary/Chest: Bilateral respiratory sounds. No wheezes, No rhonchi. · Abdominal: Soft. Bowel sounds present. No distension, No tenderness. · Musculoskeletal: No tenderness. No edema    · Lymphadenopathy: Has no cervical adenopathy. · Neurological: Alert and oriented. Cranial nerve appears intact, No Gross deficit   · Skin: Skin is warm and dry. No rash noted. · Psychiatric: Has a normal behavior       Review of System:  [x] Full ROS obtained and negative except as mentioned in HPI    Prior to Admission medications    Medication Sig Start Date End Date Taking?  Authorizing Provider   QUEtiapine (SEROQUEL) 100 MG tablet 1 tab at lunch, two tabs at bedtime 10/20/21  Yes Historical Provider, MD   senna-docusate (Heydi Footman) 8.6-50 MG per tablet Take 1 tablet by mouth daily   Yes Historical Provider, MD   Multiple Vitamins-Minerals (THERAPEUTIC MULTIVITAMIN-MINERALS) tablet Take 1 tablet by mouth daily   Yes Historical Provider, MD   Lactobacillus (PROBIOTIC ACIDOPHILUS PO) Take by mouth   Yes Historical Provider, MD   Polyethylene Glycol 3350 (MIRALAX PO) Take by mouth   Yes Historical Provider, MD   Melatonin 10 MG CAPS Take by mouth   Yes Historical Provider, MD   ibuprofen (ADVIL;MOTRIN) 400 MG tablet Take 400 mg by mouth every 6 hours as needed for Pain   Yes Historical Provider, MD   clonazePAM (KLONOPIN) 1 MG tablet Take 1 mg by mouth 3 times daily as needed   Yes Historical Provider, MD   atorvastatin (LIPITOR) 10 MG tablet Take 10 mg by mouth daily   Yes Historical Provider, MD   Venlafaxine HCl (EFFEXOR PO) Take 225 mg by mouth daily   Yes Historical Provider, MD   omeprazole (PRILOSEC) 20 MG delayed release capsule Take 20 mg by mouth 2 times daily    Yes Historical Provider, MD   traZODone (DESYREL) 100 MG tablet Take 100 mg by mouth nightly    Yes Historical Provider, MD   hydrochlorothiazide (HYDRODIURIL) 25 MG tablet Take 1 tablet by mouth daily. 12/1/10 1/6/22 Yes Seema Cabezas MD   albuterol (PROVENTIL HFA) 108 (90 BASE) MCG/ACT inhaler Inhale 2 puffs into the lungs every 6 hours as needed for Wheezing. 12/1/10 1/6/22 Yes Seema Cabezas MD   aspirin 81 MG EC tablet Take 81 mg by mouth See Admin Instructions. Indications: every M,W, and F   Yes Historical Provider, MD       Past Medical History:   Diagnosis Date    Anxiety     Asthma     Allergy induced Asthma    Depression     HYPERCHOLESTERAEMIA     Hypertension         Past Surgical History:   Procedure Laterality Date     SECTION      COLONOSCOPY  2017    colonoscopy by Dr. Allegra Moran no problems    CYST REMOVAL  [id number]    H/O pilonidal cyst (7 surgeries for that)    PILONIDAL CYST EXCISION      x 7    UPPER GASTROINTESTINAL ENDOSCOPY N/A 2020    EGD BIOPSY performed by Natalia Lamar MD at [address]   Allergen Reactions    Latex     Avocado Hives    Banana Hives    Ace Inhibitors     Kiwi Extract     Seroquel [Quetiapine] Palpitations       Social History:  Reviewed. reports that she has been smoking cigarettes. She has been smoking about 0.50 packs per day. She has never used smokeless tobacco. She reports that she does not drink alcohol and does not use drugs. Family History:  Reviewed. Reviewed. No family history of SCD. Relevant and available labs, and cardiovascular diagnostics reviewed. Reviewed. I independently reviewed all cardiac tracing. I independently reviewed relevant and available cardiac diagnostic tests ECG, CXR, Echo, Stress test, Device interrogation, Holter, CT scan. Outside medical records via Care everywhere reviewed and summarized in H&P above.     Complex medical condition with multiple medical problems affecting prognosis and outcome of EP interventions       - The patient is counseled to follow a low salt diet to assure blood pressure remains controlled for cardiovascular risk factor modification.   - The patient is counseled to avoid excess caffeine, and energy drinks as this may exacerbated ectopy and arrhythmia. - The patient is counseled to get regular exercise 3-5 times per week to control cardiovascular risk factors. - The patient is counseled to avoid tobacco use. All questions and concerns were addressed to the patient/family. Alternatives to my treatment were discussed. I have discussed the above stated plan and the patient verbalized understanding and agreed with the plan. [address] [place] [address] Dr SANTOS, Emil Ibrahim, am scribing for and in the presence of Huy Birch MD.   Nancy Sherieok 01/06/22 11:01 AM     I, Dr. Huy Birch personally performed the services described in this documentation as scribed by RN in my presence, and it is both accurate and complete.      NOTE: This report was transcribed using voice recognition software. Every effort was made to ensure accuracy, however, inadvertent computerized transcription errors may be present.      Huy Birch MD, [facility], [address] [place] [address]   Office: [phone]  Fax: [phone]

## 2023-09-25 ENCOUNTER — OFFICE VISIT (OUTPATIENT)
Dept: ORTHOPEDIC SURGERY | Age: 63
End: 2023-09-25
Payer: MEDICARE

## 2023-09-25 VITALS — WEIGHT: 160 LBS | RESPIRATION RATE: 16 BRPM | BODY MASS INDEX: 30.21 KG/M2 | HEIGHT: 61 IN

## 2023-09-25 DIAGNOSIS — M17.0 PRIMARY OSTEOARTHRITIS OF BOTH KNEES: ICD-10-CM

## 2023-09-25 DIAGNOSIS — M25.561 PAIN IN BOTH KNEES, UNSPECIFIED CHRONICITY: Primary | ICD-10-CM

## 2023-09-25 DIAGNOSIS — M25.562 PAIN IN BOTH KNEES, UNSPECIFIED CHRONICITY: Primary | ICD-10-CM

## 2023-09-25 PROCEDURE — 4004F PT TOBACCO SCREEN RCVD TLK: CPT | Performed by: ORTHOPAEDIC SURGERY

## 2023-09-25 PROCEDURE — G8419 CALC BMI OUT NRM PARAM NOF/U: HCPCS | Performed by: ORTHOPAEDIC SURGERY

## 2023-09-25 PROCEDURE — G8427 DOCREV CUR MEDS BY ELIG CLIN: HCPCS | Performed by: ORTHOPAEDIC SURGERY

## 2023-09-25 PROCEDURE — 3017F COLORECTAL CA SCREEN DOC REV: CPT | Performed by: ORTHOPAEDIC SURGERY

## 2023-09-25 PROCEDURE — 99203 OFFICE O/P NEW LOW 30 MIN: CPT | Performed by: ORTHOPAEDIC SURGERY

## 2023-09-25 RX ORDER — DIAZEPAM 10 MG/1
10 TABLET ORAL NIGHTLY PRN
COMMUNITY

## 2023-09-25 RX ORDER — BUPROPION HYDROCHLORIDE 100 MG/1
100 TABLET ORAL DAILY
COMMUNITY

## 2023-09-25 RX ORDER — PROPRANOLOL HYDROCHLORIDE 20 MG/1
20 TABLET ORAL 2 TIMES DAILY
COMMUNITY

## 2023-09-25 NOTE — PROGRESS NOTES
[address] [facility]  Office Visit    Chief Complaint: Bilateral knee instability    HPI:  Minna Curry is a 58 y.o. who is here for initial evaluation of her knees. She does not report much pain. She does report that her knees give out on her. She reports a 40-year history of right knee issues and her right knee giving out on her. This is also started to happen with her left knee in the last few weeks. There is no new injury. There is no history of surgery in either knee. She denies hip pain. She walks without assistive device. She is currently wearing a knee brace in the left knee to help with stability. She has had a steroid injection at [facility] about 5 years ago on the right knee. She does not remember if this was helpful. She takes 400 mg ibuprofen every morning. Patient Active Problem List   Diagnosis    Hypertension    Depression    Anxiety    HYPERCHOLESTERAEMIA    Cervical spondylosis with myelopathy    Palpitations    Chest pain    Dyslipidemia    Tobacco use    SVT (supraventricular tachycardia) (HCC)       ROS:  Constitutional: denies fever, chills, weight loss  MSK: denies pain in other joints, muscle aches  Neurological: denies numbness, tingling, weakness    Exam:  Resp. rate 16, height 5' 1\" (1.549 m), weight 160 lb (72.6 kg)    Appearance: sitting in exam room chair, appears to be in no acute distress, awake and alert  Resp: unlabored breathing on room air  Skin: warm, dry and intact with out erythema or significant increased temperature  Neuro: grossly intact both lower extremities. Intact sensation to light touch. Motor exam 4+ to 5/5 in all major motor groups. BLE: Negative Stinchfield exam at the hips. Examination reveals that knee range of motion is 0-130. There is valgus deformity, positive crepitus, positive joint line tenderness, positive antalgic gait. Neurologically, plantar flexion and dorsiflexion is intact. 5/5 strength.      Imagin views

## (undated) DEVICE — FORCEPS BX 240CM 2.4MM L NDL RAD JAW 4 M00513334